# Patient Record
Sex: MALE | Race: OTHER | NOT HISPANIC OR LATINO | Employment: UNEMPLOYED | URBAN - METROPOLITAN AREA
[De-identification: names, ages, dates, MRNs, and addresses within clinical notes are randomized per-mention and may not be internally consistent; named-entity substitution may affect disease eponyms.]

---

## 2020-08-29 ENCOUNTER — HOSPITAL ENCOUNTER (EMERGENCY)
Facility: HOSPITAL | Age: 65
Discharge: HOME | End: 2020-08-29
Attending: EMERGENCY MEDICINE
Payer: MEDICARE

## 2020-08-29 VITALS
SYSTOLIC BLOOD PRESSURE: 120 MMHG | HEART RATE: 80 BPM | RESPIRATION RATE: 18 BRPM | TEMPERATURE: 99 F | DIASTOLIC BLOOD PRESSURE: 60 MMHG | OXYGEN SATURATION: 98 % | WEIGHT: 200 LBS

## 2020-08-29 DIAGNOSIS — F10.920 ALCOHOLIC INTOXICATION WITHOUT COMPLICATION: Primary | ICD-10-CM

## 2020-08-29 LAB — POCT GLUCOSE: 107 MG/DL (ref 70–110)

## 2020-08-29 PROCEDURE — 82962 GLUCOSE BLOOD TEST: CPT

## 2020-08-29 PROCEDURE — 99283 EMERGENCY DEPT VISIT LOW MDM: CPT

## 2020-08-29 PROCEDURE — 99282 EMERGENCY DEPT VISIT SF MDM: CPT | Mod: ,,, | Performed by: EMERGENCY MEDICINE

## 2020-08-29 PROCEDURE — 99282 PR EMERGENCY DEPT VISIT,LEVEL II: ICD-10-PCS | Mod: ,,, | Performed by: EMERGENCY MEDICINE

## 2020-08-30 NOTE — ED PROVIDER NOTES
Encounter Date: 8/29/2020       History     Chief Complaint   Patient presents with    Alcohol Intoxication     Patient found by EMS on [address]      64 yo male presenting with ETOH intoxication.    Context:  Found by EMS on [facility].  Patient endorses ETOH this evening.  Denies injury.   Onset:  Gradual   Duration:  This evening   Associated Symptoms: no head trauma, no SI/HI    The history is provided by the patient. History limited by: Intoxication  No  was used.     Review of patient's allergies indicates:  Not on File  No past medical history on file.  No past surgical history on file.  No family history on file.  Social History     Tobacco Use    Smoking status: Not on file   Substance Use Topics    Alcohol use: Not on file    Drug use: Not on file     Review of Systems   Unable to perform ROS: Other (Intoxication )   Constitutional: Negative for fever.   HENT: Negative for facial swelling.         No facial trauma    Psychiatric/Behavioral: Negative for suicidal ideas.       Physical Exam     Initial Vitals [08/29/20 2008]   BP Pulse Resp Temp SpO2   120/60 80 18 99.1 °F (37.3 °C) 98 %      MAP       --         Physical Exam    Nursing note and vitals reviewed.  Constitutional: He is not diaphoretic. No distress.   Odor of ETOH metabolites    HENT:   Head: Normocephalic and atraumatic.   Eyes: Right eye exhibits no discharge. Left eye exhibits no discharge.   Neck: Neck supple. No tracheal deviation present.   No spinal TTP   Cardiovascular: Normal rate and regular rhythm.   Pulmonary/Chest: Breath sounds normal. No respiratory distress. He exhibits no tenderness.   Abdominal: Soft. There is no abdominal tenderness.   Musculoskeletal: No tenderness or edema.   Neurological: He is alert and oriented to person, place, and time.   Skin: Skin is warm. No rash noted.   Psychiatric: He expresses no homicidal and no suicidal ideation. He expresses no suicidal plans and no homicidal plans.    Rodo Schwarz          ED Course   Procedures  Labs Reviewed - No data to display       Imaging Results    None          Medical Decision Making:   History:   Old Records Summarized: records from previous admission(s).  Initial Assessment:   64 yo male presenting by EMS for ETOH intoxication.  No e/o traumatic injury.  No hypoglycemia.  Plan to observe until clinically sober.   ED Management:  Observed until clinically sober.  Ambulated to the restroom steadily and without assistance.  Fully oriented prior to d/c.  Patient's brother came to pick him up from the ED.  Advised outpatient PCP f/u this week.  Return precautions given.                                  Clinical Impression:       ICD-10-CM ICD-9-CM   1. Alcoholic intoxication without complication  F10.920 305.00                                oJhn Salomon MD  08/29/20 7764

## 2020-08-30 NOTE — ED NOTES
Bed: [facility]  Expected date:   Expected time:   Means of arrival:   Comments:

## 2020-08-30 NOTE — ED NOTES
Patient brought in by EMS after being found on [address] intoxicated. Patient has no complaints at this time. Patient able to ambulate to bathroom. Dr Salomon evaluating patient.

## 2020-09-09 ENCOUNTER — HOSPITAL ENCOUNTER (EMERGENCY)
Facility: HOSPITAL | Age: 65
Discharge: HOME | End: 2020-09-09
Attending: EMERGENCY MEDICINE
Payer: MEDICARE

## 2020-09-09 VITALS
HEIGHT: 68 IN | HEART RATE: 102 BPM | BODY MASS INDEX: 37.13 KG/M2 | TEMPERATURE: 98 F | OXYGEN SATURATION: 96 % | DIASTOLIC BLOOD PRESSURE: 82 MMHG | WEIGHT: 245 LBS | RESPIRATION RATE: 19 BRPM | SYSTOLIC BLOOD PRESSURE: 127 MMHG

## 2020-09-09 DIAGNOSIS — F10.920 ALCOHOLIC INTOXICATION WITHOUT COMPLICATION: Primary | ICD-10-CM

## 2020-09-09 PROCEDURE — 99283 EMERGENCY DEPT VISIT LOW MDM: CPT

## 2020-09-09 NOTE — DISCHARGE INSTRUCTIONS
Thank you for coming to our Emergency Department today. It is important to remember that some problems are difficult to diagnose and may not be found during your first visit. Be sure to follow up with your primary care doctor and review any labs/imaging that was performed with them. If you do not have a primary care doctor, you may contact the one listed on your discharge paperwork or you may also call the [facility] Appointment Desk at [phone] to schedule an appointment with one.     All medications may potentially have side effects and it is impossible to predict which medications may give you side effects. If you feel that you are having a negative effect of any medication you should immediately stop taking them and seek medical attention.    Return to the ER with any questions/concerns, new/concerning symptoms, worsening or failure to improve. Do not drive or make any important decisions for 24 hours if you have received any pain medications, sedatives or mood altering drugs during your ER visit.

## 2020-09-09 NOTE — ED TRIAGE NOTES
Pt arrived to ED via EMS with alcohol intoxication. Per EMS, he was found at a motel with multiple bottles of alcohol around him. EMS reports pt was riding around in taxi, refused to pay for taxi. Pt unable to answer questions appropriately. Slurred speech and unsteady gait observed. In no acute distress.

## 2020-09-09 NOTE — ED PROVIDER NOTES
Encounter Date: 9/9/2020       History     Chief Complaint   Patient presents with    Alcohol Intoxication     Pt BIB [facility] for alcohol intoxication. Pt found at a motel with empty bottles of alcohol with him. Slurred speech noted. Pt awake and alert      65 y.o. male No past medical history on file., apparently took a cab to his hotel and then refused to pay for the cab, PD evaluated pt and found him to be intoxicated, pt paid for cab and released to medics who transported him for intoxication. The patient has an empty bottle of wine and two full bottles on  [place]im. He denies any complaints. Per medics pt has been very inappropriate with females. Denies falls/trauma/injuries or any other complaints.        Review of patient's allergies indicates:  No Known Allergies  No past medical history on file.  No past surgical history on file.  No family history on file.  Social History     Tobacco Use    Smoking status: Not on file   Substance Use Topics    Alcohol use: Not on file    Drug use: Not on file     Review of Systems   Constitutional: Negative for fever.   HENT: Negative for sore throat.    Respiratory: Negative for shortness of breath.    Cardiovascular: Negative for chest pain.   Gastrointestinal: Negative for nausea.   Genitourinary: Negative for dysuria.   Musculoskeletal: Negative for back pain.   Skin: Negative for rash.   Neurological: Negative for weakness.   Hematological: Does not bruise/bleed easily.   All other systems reviewed and are negative.      Physical Exam     Initial Vitals [09/09/20 1358]   BP Pulse Resp Temp SpO2   104/62 92 18 98.9 °F (37.2 °C) 96 %      MAP       --         Physical Exam    Nursing note and vitals reviewed.  Constitutional: He appears well-developed and well-nourished.   HENT:   Head: Normocephalic and atraumatic.   Eyes: EOM are normal. Pupils are equal, round, and reactive to light.   Cardiovascular: Normal rate and regular rhythm.   Pulmonary/Chest: Effort normal.    Abdominal: He exhibits no distension.   Musculoskeletal: No tenderness or edema.   Neurological: He is alert and oriented to person, place, and time.   Skin: Skin is warm and dry.   Psychiatric: He has a normal mood and affect.     intoxicated, slurred speech but ambulates with nl gait/balancee    Alert/awake/oriented  ED Course   Procedures  Labs Reviewed - No data to display       Imaging Results    None                       Will monitor pt and reassess when sober.        Pt is now sober enough to ambulate through department and wants to leave. I have gone back to the room to reassess and pt is actively drinking wine from a bottle. Will discharge.    Ambulates with nl gait/balance independently without assitance. Now sober.       Clinical Impression:       ICD-10-CM ICD-9-CM   1. Alcoholic intoxication without complication  F10.920 305.00                                  Nelly Mars MD  09/09/20 1541